# Patient Record
Sex: MALE | Race: OTHER | NOT HISPANIC OR LATINO | URBAN - METROPOLITAN AREA
[De-identification: names, ages, dates, MRNs, and addresses within clinical notes are randomized per-mention and may not be internally consistent; named-entity substitution may affect disease eponyms.]

---

## 2019-02-15 ENCOUNTER — EMERGENCY (EMERGENCY)
Facility: HOSPITAL | Age: 29
LOS: 1 days | Discharge: HOME | End: 2019-02-15
Attending: PERSONAL EMERGENCY RESPONSE ATTENDANT | Admitting: PERSONAL EMERGENCY RESPONSE ATTENDANT
Payer: SELF-PAY

## 2019-02-15 VITALS
TEMPERATURE: 98 F | OXYGEN SATURATION: 100 % | RESPIRATION RATE: 16 BRPM | HEART RATE: 65 BPM | DIASTOLIC BLOOD PRESSURE: 64 MMHG | SYSTOLIC BLOOD PRESSURE: 117 MMHG

## 2019-02-15 DIAGNOSIS — Z98.890 OTHER SPECIFIED POSTPROCEDURAL STATES: Chronic | ICD-10-CM

## 2019-02-15 PROCEDURE — 99283 EMERGENCY DEPT VISIT LOW MDM: CPT

## 2019-02-15 RX ORDER — KETOROLAC TROMETHAMINE 30 MG/ML
30 SYRINGE (ML) INJECTION ONCE
Qty: 0 | Refills: 0 | Status: DISCONTINUED | OUTPATIENT
Start: 2019-02-15 | End: 2019-02-15

## 2019-02-15 RX ADMIN — Medication 30 MILLIGRAM(S): at 20:07

## 2019-02-15 NOTE — ED PROVIDER NOTE - NSFOLLOWUPINSTRUCTIONS_ED_ALL_ED_FT
1.  Take tylenol 650 mg by mouth alternating with motrin 600 mg by mouth every 3 hours (Tylenol, 3 hours later motrin, 3 hours later tylenol etc) with food/water for comfort.  2.  Make sure to hydrate with water, avoid alcohol and you should never drink any alcohol prior to, during or after taking tylenol.   3.  Use ice for bilateral knee pain to reduce inflammation, elevate bilateral legs to reduce inflammation. Use a heating pad for additional comfort for lower back.  4.  You may return to work/full physical activity as soon as you feel fully improved.    5.  Follow-up with your primary care provider as needed.  6.  For any back injury whether it is this back injury or any other, please return to ED immediately for severe or uncontrolled pain, loss of bowel or bladder control, weakness/numbness or tingling of your legs.

## 2019-02-15 NOTE — ED PROVIDER NOTE - PHYSICAL EXAMINATION
diffuse midline diffuse midline L spine TTP, TTP over bilateral SI joints and bilateral paraspinal lumbar muscles, no focal TTP, no stepoffs.      Bilateral knees, no focal area of TTP, no swelling.  No laxity on anterior/posterior drawer/lachman's, patellas tracking appropriately without crepitus, no focal TTP over bilateral patella's/popliteal fossa, remains nvsc intact distal to site.  Ambulatory with a cane which he brought with him.

## 2019-02-15 NOTE — ED ADULT TRIAGE NOTE - CHIEF COMPLAINT QUOTE
Slip and fall last night.  Presents with c/o back and right knee twisted and landed on left knee.  Denies head trauma

## 2019-02-15 NOTE — ED PROVIDER NOTE - CLINICAL SUMMARY MEDICAL DECISION MAKING FREE TEXT BOX
1.  Take tylenol 650 mg by mouth alternating with motrin 600 mg by mouth every 3 hours (Tylenol, 3 hours later motrin, 3 hours later tylenol etc) with food/water for comfort.  2.  Make sure to hydrate with water, avoid alcohol and you should never drink any alcohol prior to, during or after taking tylenol.   3.  Use ice for bilateral knee pain to reduce inflammation, elevate bilateral legs to reduce inflammation. Use a heating pad for additional comfort for lower back.  4.  You may return to work/full physical activity as soon as you feel fully improved.    5.  Follow-up with your primary care provider as needed. You were seen today for both knee and back pain.  1.  Take tylenol 650 mg by mouth alternating with motrin 600 mg by mouth every 3 hours (Tylenol, 3 hours later motrin, 3 hours later tylenol etc) with food/water for comfort.  2.  Make sure to hydrate with water, avoid alcohol and you should never drink any alcohol prior to, during or after taking tylenol.   3.  Use ice for bilateral knee pain to reduce inflammation, elevate bilateral legs to reduce inflammation. Use a heating pad for additional comfort for lower back.  4.  You may return to work/full physical activity as soon as you feel fully improved.    5.  Follow-up with your primary care provider as needed.

## 2019-02-15 NOTE — ED PROVIDER NOTE - OBJECTIVE STATEMENT
29 y/o male with no PMHx presents to the ED with s/p slipped and fall. Pt states he slipped and fell backwards on a patch of ice last night, and woke up the next day with soreness. Of note Pt has pain in lower back and both knees, Pt also state he had a meniscus tear in L knee and has had previous surgery before. Pt also denies any LOC, head trauma, or irregular BM or voiding.

## 2019-02-15 NOTE — ED PROVIDER NOTE - NS_ ATTENDINGSCRIBEDETAILS _ED_A_ED_FT
Attending MD Starr.  Agree with above.  PT seen and assessed with scribe assistance in documentation in real time.

## 2019-09-16 ENCOUNTER — EMERGENCY (EMERGENCY)
Facility: HOSPITAL | Age: 29
LOS: 1 days | Discharge: HOME | End: 2019-09-16
Attending: EMERGENCY MEDICINE | Admitting: EMERGENCY MEDICINE
Payer: MEDICAID

## 2019-09-16 ENCOUNTER — TRANSCRIPTION ENCOUNTER (OUTPATIENT)
Age: 29
End: 2019-09-16

## 2019-09-16 VITALS
SYSTOLIC BLOOD PRESSURE: 121 MMHG | HEART RATE: 67 BPM | RESPIRATION RATE: 18 BRPM | DIASTOLIC BLOOD PRESSURE: 76 MMHG | TEMPERATURE: 98 F | OXYGEN SATURATION: 100 %

## 2019-09-16 VITALS
RESPIRATION RATE: 18 BRPM | SYSTOLIC BLOOD PRESSURE: 117 MMHG | DIASTOLIC BLOOD PRESSURE: 73 MMHG | HEART RATE: 58 BPM | OXYGEN SATURATION: 100 % | TEMPERATURE: 99 F

## 2019-09-16 DIAGNOSIS — Z98.890 OTHER SPECIFIED POSTPROCEDURAL STATES: Chronic | ICD-10-CM

## 2019-09-16 PROCEDURE — 99284 EMERGENCY DEPT VISIT MOD MDM: CPT

## 2019-09-16 PROCEDURE — 99283 EMERGENCY DEPT VISIT LOW MDM: CPT

## 2019-09-16 RX ORDER — PSEUDOEPHEDRINE HCL 30 MG
30 TABLET ORAL ONCE
Refills: 0 | Status: COMPLETED | OUTPATIENT
Start: 2019-09-16 | End: 2019-09-16

## 2019-09-16 RX ORDER — KETOROLAC TROMETHAMINE 30 MG/ML
30 SYRINGE (ML) INJECTION ONCE
Refills: 0 | Status: DISCONTINUED | OUTPATIENT
Start: 2019-09-16 | End: 2019-09-16

## 2019-09-16 RX ADMIN — Medication 30 MILLIGRAM(S): at 06:27

## 2019-09-16 NOTE — ED PROVIDER NOTE - NSFOLLOWUPINSTRUCTIONS_ED_ALL_ED_FT
-- Please use 650mg Tylenol (also called acetaminophen) every 4 hours & 600mg Motrin (also called Advil or ibuprofen) every 6 hours as needed for pain/discomfort/swelling. You can get these without a prescription. Don't use more than 3000mg of Tylenol in any 24-hour period. Make sure your other prescription/over-the-counter medications don't contain any Tylenol so you don't take too much. If you have any stomach discomfort while taking Motrin, you can use TUMS or Pepcid or Zantac (these can also be bought without a prescription).   -- Use the nasal rinse frequently.  Use the phenylephrine nasal spray twice a day.  -- Please follow up with your doctor(s) within 3 days, but seek care sooner if your symptoms are worsening.  -- Rest, increase your fluid intake and avoid dehydration & alcohol.   -- It is very important to be diligent about hand washing & hygiene to avoid spreading the illness to others.  -- If you are having any worsening or continued fever, chills, weakness, nausea, vomiting, abdominal pain, please see your doctor or return to the Emergency Department.  -- Please continue taking your home medications as directed.  Do not use alcohol when taking any medication (especially antibiotics, tylenol or other pain medication) unless you check with the doctor or pharmacist.

## 2019-09-16 NOTE — ED ADULT TRIAGE NOTE - CHIEF COMPLAINT QUOTE
pt. seen earlier today for HA/congestion, dx w/ a sinus infection, prescribed a nasal spray and told to take Tylenol/Ibuprofen but has not taken any, reports no improvement in symptoms. Denies PMHx.

## 2019-09-16 NOTE — ED PROVIDER NOTE - CLINICAL SUMMARY MEDICAL DECISION MAKING FREE TEXT BOX
Well appearing pt w/cough and congestion. WN/WD/WH in NAD. Non toxic, no sign SBI including sepsis, meningitis, pneumonia, or pharyngitis. No abd pathology suspected based on exam and presentation.  No labs or imaging needed. Consistent with viral URI. Motrin/tylenol prn fever, fluids, anticipatory guidance, PMD follow up, d/c home.

## 2019-09-16 NOTE — ED PROVIDER NOTE - OBJECTIVE STATEMENT
28M no hx now p/w HA, midface/sinus pressure, sensation of dripping down his throat making him cough, congestion x 2 days, worse tonight.  took OTC thjeraflu, nasal rinse, allegra, nyquil without relief.  No fever.

## 2019-09-16 NOTE — ED PROVIDER NOTE - PATIENT PORTAL LINK FT
You can access the FollowMyHealth Patient Portal offered by [facility] by registering at the following website: [email or web address]. By joining [employer]’s FollowMyHealth portal, you will also be able to view your health information using other applications (apps) compatible with our system.

## 2019-09-16 NOTE — ED ADULT TRIAGE NOTE - CHIEF COMPLAINT QUOTE
Pt p/w with HA, sinus pressure, productive cough, congestion x 2 days.  took OTC cough med without relief.  rpts seasonal allergies.  denies pmhx

## 2019-09-16 NOTE — ED PROVIDER NOTE - PHYSICAL EXAMINATION
VS: afebrile, others reassuring  Gen: Well appearing in NAD  Head: NC/AT  HEENT: mild sinus ttp b/l, moist mucous membranes   nasal mucosa inflammed w/o polyp  Neck: trachea midline  Resp:  No distress  Ext: no deformities  Neuro:  A&Ox3  Skin:  Warm and dry as visualized  Psych:  appropriate affect and mood

## 2019-09-17 VITALS
TEMPERATURE: 97 F | OXYGEN SATURATION: 99 % | HEART RATE: 61 BPM | SYSTOLIC BLOOD PRESSURE: 108 MMHG | DIASTOLIC BLOOD PRESSURE: 56 MMHG | RESPIRATION RATE: 18 BRPM

## 2019-09-17 LAB
ALBUMIN SERPL ELPH-MCNC: 5.1 G/DL — HIGH (ref 3.3–5)
ALP SERPL-CCNC: 78 U/L — SIGNIFICANT CHANGE UP (ref 40–120)
ALT FLD-CCNC: 27 U/L — SIGNIFICANT CHANGE UP (ref 4–41)
ANION GAP SERPL CALC-SCNC: 12 MMO/L — SIGNIFICANT CHANGE UP (ref 7–14)
AST SERPL-CCNC: 20 U/L — SIGNIFICANT CHANGE UP (ref 4–40)
BILIRUB SERPL-MCNC: 0.6 MG/DL — SIGNIFICANT CHANGE UP (ref 0.2–1.2)
BUN SERPL-MCNC: 7 MG/DL — SIGNIFICANT CHANGE UP (ref 7–23)
CALCIUM SERPL-MCNC: 9.6 MG/DL — SIGNIFICANT CHANGE UP (ref 8.4–10.5)
CHLORIDE SERPL-SCNC: 103 MMOL/L — SIGNIFICANT CHANGE UP (ref 98–107)
CO2 SERPL-SCNC: 26 MMOL/L — SIGNIFICANT CHANGE UP (ref 22–31)
CREAT SERPL-MCNC: 1 MG/DL — SIGNIFICANT CHANGE UP (ref 0.5–1.3)
GLUCOSE SERPL-MCNC: 72 MG/DL — SIGNIFICANT CHANGE UP (ref 70–99)
HCT VFR BLD CALC: 46.2 % — SIGNIFICANT CHANGE UP (ref 39–50)
HGB BLD-MCNC: 14.4 G/DL — SIGNIFICANT CHANGE UP (ref 13–17)
MCHC RBC-ENTMCNC: 27.8 PG — SIGNIFICANT CHANGE UP (ref 27–34)
MCHC RBC-ENTMCNC: 31.2 % — LOW (ref 32–36)
MCV RBC AUTO: 89.2 FL — SIGNIFICANT CHANGE UP (ref 80–100)
NRBC # FLD: 0 K/UL — SIGNIFICANT CHANGE UP (ref 0–0)
PLATELET # BLD AUTO: 160 K/UL — SIGNIFICANT CHANGE UP (ref 150–400)
PMV BLD: 11 FL — SIGNIFICANT CHANGE UP (ref 7–13)
POTASSIUM SERPL-MCNC: 4 MMOL/L — SIGNIFICANT CHANGE UP (ref 3.5–5.3)
POTASSIUM SERPL-SCNC: 4 MMOL/L — SIGNIFICANT CHANGE UP (ref 3.5–5.3)
PROT SERPL-MCNC: 8.4 G/DL — HIGH (ref 6–8.3)
RBC # BLD: 5.18 M/UL — SIGNIFICANT CHANGE UP (ref 4.2–5.8)
RBC # FLD: 11.9 % — SIGNIFICANT CHANGE UP (ref 10.3–14.5)
SODIUM SERPL-SCNC: 141 MMOL/L — SIGNIFICANT CHANGE UP (ref 135–145)
WBC # BLD: 8.54 K/UL — SIGNIFICANT CHANGE UP (ref 3.8–10.5)
WBC # FLD AUTO: 8.54 K/UL — SIGNIFICANT CHANGE UP (ref 3.8–10.5)

## 2019-09-17 PROCEDURE — 70496 CT ANGIOGRAPHY HEAD: CPT | Mod: 26

## 2019-09-17 RX ORDER — ACETAMINOPHEN 500 MG
975 TABLET ORAL ONCE
Refills: 0 | Status: COMPLETED | OUTPATIENT
Start: 2019-09-17 | End: 2019-09-17

## 2019-09-17 RX ORDER — METOCLOPRAMIDE HCL 10 MG
10 TABLET ORAL ONCE
Refills: 0 | Status: COMPLETED | OUTPATIENT
Start: 2019-09-17 | End: 2019-09-17

## 2019-09-17 RX ADMIN — Medication 10 MILLIGRAM(S): at 01:13

## 2019-09-17 RX ADMIN — Medication 975 MILLIGRAM(S): at 01:13

## 2019-09-17 NOTE — ED PROVIDER NOTE - PATIENT PORTAL LINK FT
You can access the FollowMyHealth Patient Portal offered by [facility] by registering at the following website: [email or web address]. By joining [employer]’s FollowMyHealth portal, you will also be able to view your health information using other applications (apps) compatible with our system.

## 2019-09-17 NOTE — ED PROVIDER NOTE - NSFOLLOWUPINSTRUCTIONS_ED_ALL_ED_FT
Follow up with your PMD within 48-72 hours.  Take Motrin 600 mg every 8 hours with food for pain. Take Tylenol 650mg 1 tab every 4-6 hours. Follow up with Neurology within the week. If you have issues obtaining follow up, please call: [phone] to obtain a doctor or specialist who takes your insurance in your area.   Worsening headache, dizziness, nausea, vomiting, weakness return to ER

## 2019-09-17 NOTE — ED PROVIDER NOTE - CLINICAL SUMMARY MEDICAL DECISION MAKING FREE TEXT BOX
27 y/o male with no PMHx presents to the ED c/o L sided occular pressure, dizziness, headache, nasal congestion for a long time but has worsened in the past 2 days. Sinusitis vs. migraine vs blood vessel injury. Plan for blood work and CT head. 27 y/o male with no PMHx presents to the ED c/o L sided occular pressure, dizziness, headache, nasal congestion for a long time but has worsened in the past 2 days. Sinusitis vs. migraine vs blood vessel injury (eg, venous sinus thrombosis). Plan for blood work and CT head. Pain control.

## 2019-09-17 NOTE — ED PROVIDER NOTE - OBJECTIVE STATEMENT
29 y/o male with no PMHx presents to the ED c/o L sided occular pressure, dizziness, headache, nasal congestion for a long time but has worsened in the past 2 days. Pt reports he recently smoked hookah. Pt reports pain is radiating to the L sided back of his head. Also reports hot flashes and nothing coming out when blowing his nose. Pt's R side of his face/ head unaffected. No fevers, nausea or vomiting. Pt was given decongestant that offered mild relief for 2 hours but symptoms returned. No other acute complaints at time of eval.

## 2020-03-05 ENCOUNTER — EMERGENCY (EMERGENCY)
Facility: HOSPITAL | Age: 30
LOS: 1 days | Discharge: HOME | End: 2020-03-05
Admitting: EMERGENCY MEDICINE
Payer: MEDICAID

## 2020-03-05 VITALS
SYSTOLIC BLOOD PRESSURE: 125 MMHG | TEMPERATURE: 98 F | HEART RATE: 53 BPM | OXYGEN SATURATION: 100 % | DIASTOLIC BLOOD PRESSURE: 60 MMHG | RESPIRATION RATE: 17 BRPM

## 2020-03-05 DIAGNOSIS — Z98.890 OTHER SPECIFIED POSTPROCEDURAL STATES: Chronic | ICD-10-CM

## 2020-03-05 PROCEDURE — 99282 EMERGENCY DEPT VISIT SF MDM: CPT

## 2020-03-05 NOTE — ED PROVIDER NOTE - PATIENT PORTAL LINK FT
You can access the FollowMyHealth Patient Portal offered by [facility] by registering at the following website: [email or web address]. By joining [employer]’s FollowMyHealth portal, you will also be able to view your health information using other applications (apps) compatible with our system.

## 2020-03-05 NOTE — ED PROVIDER NOTE - CLINICAL SUMMARY MEDICAL DECISION MAKING FREE TEXT BOX
L lower eyelid hordeolum   - warm compresses   - follow up with pcp  - if any worsening symptoms return to ED.

## 2020-03-05 NOTE — ED PROVIDER NOTE - NSFOLLOWUPINSTRUCTIONS_ED_ALL_ED_FT
Follow up with your primary care physician in 48-72 hours- bring copies of your results.  Return to the ER for worsening or persistent symptoms, including but not limited to worsening/persistent pain, shortness of breath, fevers, vomiting, lightheadedness, passing out and/or ANY NEW OR CONCERNING SYMPTOMS. If you have issues obtaining follow up, please call: [phone] to obtain a doctor or specialist who takes your insurance in your area.  You may call [phone] to make an appointment with the internal medicine clinic.    Apply warm compresses to the area daily   Do not rub eye with fingers  If no resolution within 1-2 weeks schedule an appointment with ophthalmology for further evaluation. Information was given to you.

## 2020-03-05 NOTE — ED PROVIDER NOTE - CHIEF COMPLAINT
The patient is a 29y Male complaining of eye pain/injury.
No evidence of meningial irritation/Supple/No adenopathy

## 2020-03-05 NOTE — ED ADULT TRIAGE NOTE - CHIEF COMPLAINT QUOTE
Pt c/o swelling to left eye. Pt noted to have slight redness and swelling to left eye. Pt denies visual disturbances.

## 2020-03-05 NOTE — ED PROVIDER NOTE - OBJECTIVE STATEMENT
28 y/o M no reported pmhx p/w L lower eyelid swelling x 1 week. Tried applying ice with no relief. Pt denies any drainage. No trauma. No visual change, blurry or double vision. No fever, chills, or URI symptoms.

## 2022-10-13 NOTE — ED PROVIDER NOTE - ENMT, MLM
PRE-SEDATION ASSESSMENT    CONSENT  Risks, benefits, and alternatives have been discussed with the patient/patient representative, and patient/patient representative agrees to proceed: Yes    MEDICAL HISTORY  Significant medical/surgical history: No  Past Complications with Sedation/Anesthesia: No  Significant Family History: No  Smoking History: No  Alcohol/Drug abuse: No  Possible Pregnancy (LMP): Not Applicable  Cardiac History: Yes  Respiratory History: No    PHYSICAL EXAM  History and Physical Reviewed: H&P completed today  Airway Risk History: No previous complications  Airway Anatomy : Class II  Heart : Normal  Lungs : Normal  LOC/Mental Status : Normal    OTHER FINDINGS  Reviewed current medications and allergies: Yes  Pertinent lab/diagnostic test reviewed: Yes    SEDATION RISK ASSESSMENT  Risk Status ASA: Class II - Normal patient with mild systemic disease  Plan for Sedation: Moderate Sedation  EKG Monitoring: Yes    NARRATIVE FINDINGS      Airway patent, Nasal mucosa clear. Mouth with normal mucosa. Throat has no vesicles, no oropharyngeal exudates and uvula is midline.

## 2024-07-02 NOTE — ED ADULT TRIAGE NOTE - AS TEMP SITE
Lab Results   Component Value Date    EGFR 56 06/28/2024    EGFR 60 05/16/2024    EGFR 67 10/05/2021    CREATININE 1.48 (H) 06/28/2024    CREATININE 1.39 (H) 05/16/2024    CREATININE 1.48 (H) 10/05/2021         Lab Results   Component Value Date    HGBA1C 6.2 (H) 05/30/2024      [id number]: Albumin Cr Ratio WNL  17IRR36: Uric Acid 6.2  68VRP50: UA Normal  US Kidney unremarkable according to nephrology note however could not find recent report   Baseline sCr. 1.4-1.5.    Previously following nephro but lost to follow up  Now following [facility] Nephro w/ last visit [id number]    Symptoms of lower extremity swelling    Uric acid nephropathy vs atherosclerotic nephropathy vs other vs Nephrotic nephritic syndrome  Nephrotic nephritic less likely since urine protein not elevated.     Plan:  Avoid hypoperfusion of the kidneys, minimize nephrotoxins  Control modifiable risk factors, BP Glucose  Avoid nephrotoxic agents like NSAIDS RAAS Blockers  Continue allopurinol 100mg PO daily w/ goal uric acid <6  Consider ECHO if symptoms do not resolve for potential cardiac evolvement   Strict lifestyle management  Life style modifications  Eat real food, not too much, mostly plants  Avoid processed foods  DASH Diet  Limit salt intake: education given on how to read nutriton labels to find salt (ie listed as Salt, NaCl, Sodium). Pt was educated on types of foods and drinks with high salt content.   Do not drink your calories  Eat a piece of fruit or vegetable 30 mins prior to meals  Avoid sugar and sugar substitutes  Limit meat products  Per AHA guidelines, recommend moderate-vigorous intensity exercise for 30 minutes a day for 5 days a week or a total of 150 min/week  Keep exercise journal  Small changes in activity makes a big difference ie take stairs instead of elevators or park farther away from you destination  Try to weigh yourself daily at same time of day and keep journal  Keep food journal   Sleep hygine  Mindful meditation for  15mins a day 5 days a week. [employer] timer a good free walt.      oral

## 2024-11-22 NOTE — ED PROVIDER NOTE - ATTESTATION, MLM
I have reviewed and confirmed nurses' notes for patient's medications, allergies, medical history, and surgical history. Patient received A&O x3 breathing unlabored complaining of right shoulder pain.

## 2024-11-24 ENCOUNTER — EMERGENCY (EMERGENCY)
Facility: HOSPITAL | Age: 34
LOS: 0 days | Discharge: HOME | End: 2024-11-25
Attending: STUDENT IN AN ORGANIZED HEALTH CARE EDUCATION/TRAINING PROGRAM
Payer: MEDICAID

## 2024-11-24 VITALS
SYSTOLIC BLOOD PRESSURE: 118 MMHG | HEART RATE: 65 BPM | HEIGHT: 74 IN | WEIGHT: 195.11 LBS | RESPIRATION RATE: 16 BRPM | TEMPERATURE: 98 F | OXYGEN SATURATION: 98 % | DIASTOLIC BLOOD PRESSURE: 72 MMHG

## 2024-11-24 DIAGNOSIS — M25.561 PAIN IN RIGHT KNEE: ICD-10-CM
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
## 2024-11-24 DIAGNOSIS — M54.50 LOW BACK PAIN, UNSPECIFIED: ICD-10-CM
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
## 2024-11-24 DIAGNOSIS — M25.562 PAIN IN LEFT KNEE: ICD-10-CM

## 2024-11-24 DIAGNOSIS — Z98.890 OTHER SPECIFIED POSTPROCEDURAL STATES: Chronic | ICD-10-CM

## 2024-11-24 PROCEDURE — 99284 EMERGENCY DEPT VISIT MOD MDM: CPT

## 2024-11-24 NOTE — ED ADULT TRIAGE NOTE - CHIEF COMPLAINT QUOTE
pt c/o bilateral knee pain and lower back pain, since friday, states while at work, he slid on ice/snow and fell down. denies hitting head. pt took tylenol yesterday with some relief.

## 2024-11-25 RX ORDER — KETOROLAC TROMETHAMINE 30 MG/ML
15 INJECTION INTRAMUSCULAR; INTRAVENOUS ONCE
Refills: 0 | Status: DISCONTINUED | OUTPATIENT
Start: 2024-11-25 | End: 2024-11-25

## 2024-11-25 RX ORDER — LIDOCAINE HYDROCHLORIDE 40 MG/ML
1 SOLUTION TOPICAL ONCE
Refills: 0 | Status: COMPLETED | OUTPATIENT
Start: 2024-11-25 | End: 2024-11-25

## 2024-11-25 RX ORDER — METHOCARBAMOL 500 MG/1
1500 TABLET ORAL ONCE
Refills: 0 | Status: COMPLETED | OUTPATIENT
Start: 2024-11-25 | End: 2024-11-25

## 2024-11-25 RX ORDER — IBUPROFEN 200 MG
600 TABLET ORAL ONCE
Refills: 0 | Status: COMPLETED | OUTPATIENT
Start: 2024-11-25 | End: 2024-11-25

## 2024-11-25 RX ORDER — NAPROXEN 250 MG/1
1 TABLET ORAL
Qty: 20 | Refills: 0
Start: 2024-11-25 | End: 2024-12-04

## 2024-11-25 RX ORDER — METHOCARBAMOL 500 MG/1
2 TABLET ORAL
Qty: 40 | Refills: 0
Start: 2024-11-25 | End: 2024-12-04

## 2024-11-25 RX ADMIN — LIDOCAINE HYDROCHLORIDE 1 PATCH: 40 SOLUTION TOPICAL at 00:43

## 2024-11-25 RX ADMIN — Medication 600 MILLIGRAM(S): at 00:45

## 2024-11-25 RX ADMIN — METHOCARBAMOL 1500 MILLIGRAM(S): 500 TABLET ORAL at 00:46

## 2024-11-25 NOTE — ED PROVIDER NOTE - NS ED ROS FT
CONST: no fevers, no chills  EYES: no pain, no vision changes  ENT: no sore throat, no ear pain, no change in hearing  CV: no chest pain, no leg swelling  RESP: no shortness of breath, no cough  ABD: no abdominal pain, no nausea, no vomiting, no diarrhea  : no dysuria, no flank pain, no hematuria  MSK: (+) back pain, (+) extremity pain  NEURO: no headache or additional neurologic complaints  HEME: no easy bleeding  SKIN:  no rash

## 2024-11-25 NOTE — ED PROVIDER NOTE - INTERNATIONAL TRAVEL
Get eyes checked    Consider ACP consultation to help with ACP and living will    -Recommend 150 minutes of cardiovascular activity a week, or 10,000 to 15,000 steps a day, 2 days of weightbearing  -dietary wise, try to stick to your weight x 10 in calories a day  -avoid processed/refined carbohydrates (boxed/canned/frozen/fast)  -encourage healthy protein and fat, butter, avocado, egg    Get blood work today and urine studies today    See me back in 6 months  Personalized Preventive Plan for Carolyn Coker - 11/1/2021  Medicare offers a range of preventive health benefits. Some of the tests and screenings are paid in full while other may be subject to a deductible, co-insurance, and/or copay. Some of these benefits include a comprehensive review of your medical history including lifestyle, illnesses that may run in your family, and various assessments and screenings as appropriate. After reviewing your medical record and screening and assessments performed today your provider may have ordered immunizations, labs, imaging, and/or referrals for you. A list of these orders (if applicable) as well as your Preventive Care list are included within your After Visit Summary for your review. Other Preventive Recommendations:    · A preventive eye exam performed by an eye specialist is recommended every 1-2 years to screen for glaucoma; cataracts, macular degeneration, and other eye disorders. · A preventive dental visit is recommended every 6 months. · Try to get at least 150 minutes of exercise per week or 10,000 steps per day on a pedometer . · Order or download the FREE \"Exercise & Physical Activity: Your Everyday Guide\" from [employer]. Call 7-301.253.6478 or search [employer] online. · You need 9640-4905 mg of calcium and 1940-2465 IU of vitamin D per day.  It is possible to meet your calcium requirement with diet alone, but a vitamin D supplement is usually necessary to meet this goal.  · When exposed to the sun, use a sunscreen that protects against both UVA and UVB radiation with an SPF of 30 or greater. Reapply every 2 to 3 hours or after sweating, drying off with a towel, or swimming. · Always wear a seat belt when traveling in a car. Always wear a helmet when riding a bicycle or motorcycle. No

## 2024-11-25 NOTE — ED PROVIDER NOTE - NSFOLLOWUPCLINICS_GEN_ALL_ED_FT
[facility]  Sports Medicine  [address] [place] [address]  Phone: [phone]  Fax:

## 2024-11-25 NOTE — ED PROVIDER NOTE - PATIENT PORTAL LINK FT
You can access the FollowMyHealth Patient Portal offered by [facility] by registering at the following website: [email or web address]. By joining [employer]’s FollowMyHealth portal, you will also be able to view your health information using other applications (apps) compatible with our system.

## 2024-11-25 NOTE — ED ADULT NURSE NOTE - OBJECTIVE STATEMENT
34Y A&OX4 M no PMH c/o lower back pain and B/L knee pain s/p fall on Friday. [place]. states he fell on his lower back and B/L knees. states pain has worsened since the fall and hasn't been able to go to the doctor since. denies head strike/LOC.

## 2024-11-25 NOTE — ED PROVIDER NOTE - PHYSICAL EXAMINATION
General: No acute distress, mentation at baseline,  well nourished, well developed  HEENT: NCAT, Neck supple without meningismus, PERRL, no conjunctival injection  Lungs: CTAB, No wheeze or crackles, No retractions, No increased work of breathing  Heart: S1S2 RRR, No M/R/G, Pules equal Bilaterally in upper and lower extremities distally  Abd: soft, NT/ND, No guarding, No rebound.  No hernias, no palpable masses.  Extrem: FROM in all joints, no gross deformities appreciated, no significant edema noted, No ulcers. Cap refil < 2sec.  Skin: No rash noted, warm dry.  Neuro:  Grossly normal.  No difficulty ambulating. No focal deficits.  Psychiatric: Appropriate mood and affect.   Back: No midline ttp, step offs, deformities, no cvat, paraspinal TTP in lumbar spine  B/l knee: Patella nontender, Medial and Lateral Joint lines nontender, Posterior drawer and Lachman’s exam without significant laxity, Varus and Valgus Stress without significant laxity, Full Range of Motion with full strength, Neurovascular exam distally intact, Compartments surrounding are soft

## 2024-11-25 NOTE — ED ADULT NURSE NOTE - NSFALLRISKINTERV_ED_ALL_ED

## 2024-11-25 NOTE — ED PROVIDER NOTE - CLINICAL SUMMARY MEDICAL DECISION MAKING FREE TEXT BOX
\34-year-old male with no past medical history presenting with fall 3 days ago.  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  Experiencing lower back pain and bilateral knee pain, did not directly fall onto knees but states he twisted both knees.  Denies any head trauma LOC.  Took Tylenol with some relief.  Exam findings above, low suspcion for fracture, clinical presentation likely secondary to sprain.  Patient offered imaging but shared decision making had, suspicion for fracture and patient states he would prefer to get referral for MRI.  Will discharge with analgesia and provide analgesia here

## 2024-12-08 ENCOUNTER — EMERGENCY (EMERGENCY)
Facility: HOSPITAL | Age: 34
LOS: 1 days | Discharge: HOME | End: 2024-12-08
Admitting: EMERGENCY MEDICINE
Payer: MEDICAID

## 2024-12-08 VITALS
HEART RATE: 75 BPM | TEMPERATURE: 98 F | OXYGEN SATURATION: 96 % | WEIGHT: 210.1 LBS | HEIGHT: 74 IN | RESPIRATION RATE: 16 BRPM | SYSTOLIC BLOOD PRESSURE: 121 MMHG | DIASTOLIC BLOOD PRESSURE: 74 MMHG

## 2024-12-08 DIAGNOSIS — Z98.890 OTHER SPECIFIED POSTPROCEDURAL STATES: Chronic | ICD-10-CM

## 2024-12-08 PROCEDURE — 99284 EMERGENCY DEPT VISIT MOD MDM: CPT

## 2024-12-08 RX ORDER — IBUPROFEN 200 MG
600 TABLET ORAL ONCE
Refills: 0 | Status: COMPLETED | OUTPATIENT
Start: 2024-12-08 | End: 2024-12-08

## 2024-12-08 RX ORDER — LIDOCAINE 40 MG/G
1 CREAM TOPICAL ONCE
Refills: 0 | Status: COMPLETED | OUTPATIENT
Start: 2024-12-08 | End: 2024-12-08

## 2024-12-08 RX ADMIN — LIDOCAINE 1 PATCH: 40 CREAM TOPICAL at 23:43

## 2024-12-08 RX ADMIN — Medication 600 MILLIGRAM(S): at 23:43

## 2024-12-08 NOTE — ED PROVIDER NOTE - PROGRESS NOTE DETAILS
KLAUDIA Mehta: pt feels better ambulating without difficulty.  Results reviewed with patient.  Discharge reviewed and discussed with patient.

## 2024-12-08 NOTE — ED ADULT NURSE NOTE - NSFALLRISKINTERV_ED_ALL_ED

## 2024-12-08 NOTE — ED ADULT NURSE NOTE - NS ED NOTE ABUSE SUSPICION NEGLECT YN
Called and left message for patient to advise that colorectal screening is due. Asked patient to return call to the office to discuss.  
No

## 2024-12-08 NOTE — ED PROVIDER NOTE - PATIENT PORTAL LINK FT
You can access the FollowMyHealth Patient Portal offered by [facility] by registering at the following website: [email or web address]. By joining [employer]’s FollowMyHealth portal, you will also be able to view your health information using other applications (apps) compatible with our system.

## 2024-12-08 NOTE — ED PROVIDER NOTE - CLINICAL SUMMARY MEDICAL DECISION MAKING FREE TEXT BOX
34-year-old male with no significant past medical history presents to the emergency department status post mechanical trip and fall 6 days ago c/o low back pain and b/l knee pain.  Patient states he slipped on ice causing injury. Pt is well appearing, NAD, NV intact, MSK pain, will obtain xrays, pain control, follow up PMD/Ortho.

## 2024-12-08 NOTE — ED ADULT NURSE NOTE - OBJECTIVE STATEMENT
pt received to intake, A&Ox4. Pt endorsing lower back pain, b/l knee pain s/p slip and fall on ice on monday. Pt denies LOC/head strike/blood thinner use. No deformity/trauma noted. pt denies numbness/tingling, headache, dizziness, SOB, N/V/D, chest pain. respirations even and unlabored. pending XRAY. safety maintained, call bell in reach

## 2024-12-08 NOTE — ED PROVIDER NOTE - PHYSICAL EXAMINATION
+TTP right sided paraspinal lumbar region no midline TTP, no swelling, no deformity noted.  B/L knees:  +TTP at anterior knees, no swelling, no deformity, FROM, NV intact.

## 2024-12-08 NOTE — ED ADULT TRIAGE NOTE - MEANS OF ARRIVAL
-- DO NOT REPLY / DO NOT REPLY ALL --  -- Message is from the [facility]--    COVID-19 Universal Screening: N/A - Not about scheduling    General Patient Message      Reason for Call: Patient has a question for Dr. Tubbs. Please call back.    Caller Information       Type Contact Phone    09/21/2020 11:23 AM CDT Phone (Incoming) Manjit Iqbal (Self) [phone] (H)          Alternative phone number: none    Turnaround time given to caller:   \"This message will be sent to [state Provider's name]. The clinical team will fulfill your request as soon as they review your message.\"     ambulatory

## 2024-12-08 NOTE — ED PROVIDER NOTE - NSFOLLOWUPINSTRUCTIONS_ED_ALL_ED_FT
Follow up with your Doctor in 1-2 days.  Follow up with Orthopedics in 1-2 days.(see attached list)  Rest.  Ice 3-4 x a day.   Take Ibuprofen 400mg orally every 6 hours as needed for pain take with food.  Return to the ER for any persistent/worsening or new symptoms weakness, numbness or any concerning symptoms.

## 2024-12-08 NOTE — ED PROVIDER NOTE - OBJECTIVE STATEMENT
34-year-old male with no significant past medical history presents to the emergency department status post mechanical trip and fall 6 days ago c/o low back pain and b/l knee pain.  Patient states he slipped on ice causing injury.  Patient denies head trauma, LOC, weakness, numbness, tingling, fecal/urinary incontinence.

## 2024-12-08 NOTE — ED ADULT TRIAGE NOTE - CHIEF COMPLAINT QUOTE
c/o lower back pain and b/l knee pain s/p slip and fall on Monday. Denies LOC, head trauma, blood thinners. Denies medical history.

## 2024-12-08 NOTE — ED ADULT TRIAGE NOTE - MODE OF ARRIVAL
1/28/2021   CARE MANAGEMENT NOTE:  CM reviewed EMR for clinical updates. Pt was admitted with COVID, acute respiratory insufficiency with hypoxia.    Reportedly, pt resides with his wife who has been to inpt psych.    Secondary Decision Maker: Augie Castillo - Child - [phone]    Secondary Decision Maker: Claudette Springer - Son - [phone]    Secondary Decision Maker: Miranda Mason - Daughter - [phone]   Luz Maria Nyaer, pt's brother in law ([phone]) is employed in healthcare and he has been instrumental in encouraging pt to consider pulmonary rehab.     RUR 10%; LOS 17 days     Transition Plan of Care:   1.  Psych eval completed on 1/26 and per NP pt does not have capacity to make medical decisions. Son Xavier Even agrees to pt going to pulmonary rehab especially since pt cannot be in the home alone (pt's wife will be in a facility for one month). 2.  [facility] inpt rehab - pt has been accepted and bed is available today. RN, please call report to [phone]. Pt will go to room 208. 3.  [facility] was arranged for 4:45 p.m however they called to report a delay and revised pickup at 5:45 p.m.        No further post discharge needs identified at this writing.    [facility]

## 2024-12-09 VITALS
HEART RATE: 61 BPM | SYSTOLIC BLOOD PRESSURE: 112 MMHG | RESPIRATION RATE: 18 BRPM | OXYGEN SATURATION: 98 % | TEMPERATURE: 98 F | DIASTOLIC BLOOD PRESSURE: 67 MMHG

## 2024-12-09 PROCEDURE — 73564 X-RAY EXAM KNEE 4 OR MORE: CPT | Mod: 26,50

## 2025-06-10 NOTE — ED PROVIDER NOTE - TEMPLATE, MLM
"  Assessment & Plan   Problem List Items Addressed This Visit    None  Visit Diagnoses         Shortness of breath    -  Primary    Relevant Medications    budesonide-formoterol (SYMBICORT/BREYNA) 80-4.5 MCG/ACT inhaler    doxycycline hyclate (VIBRA-TABS) 100 MG tablet    predniSONE (DELTASONE) 20 MG tablet      Cough, unspecified type        Relevant Medications    budesonide-formoterol (SYMBICORT/BREYNA) 80-4.5 MCG/ACT inhaler    doxycycline hyclate (VIBRA-TABS) 100 MG tablet    predniSONE (DELTASONE) 20 MG tablet      Hx of bacterial pneumonia        Relevant Medications    budesonide-formoterol (SYMBICORT/BREYNA) 80-4.5 MCG/ACT inhaler    doxycycline hyclate (VIBRA-TABS) 100 MG tablet    predniSONE (DELTASONE) 20 MG tablet      Abdominal pain, right upper quadrant        Relevant Orders    US Abdomen Limited           Assessment & Plan  Dyspnea:  - Likely related to a lingering infection from previous pneumonia.  - Prescribe Symbicort inhaler, 2 puffs twice a day. Consider pulmonary function testing if symptoms persist.    Cough, unspecified type:  -recent chest xray, recent CT scan   - Persistent cough possibly due to lingering infection.  - Prescribe doxycycline for broader antibiotic coverage. Prescribe prednisone, 2 pills once a day for 5 days, then 1 pill once a day for 5 days.    Hx of bacterial pneumonia:  - Previous pneumonia may have led to current symptoms.  - Monitor symptoms and reassess if no improvement by the end of the week or early next week.    Abdominal pain, right upper quadrant:  - Unclear etiology, possibly related to soft tissue.  - Order an ultrasound to investigate further.    Consent was obtained from the patient to use an AI documentation tool in the creation of this note.       BMI  Estimated body mass index is 39 kg/m  as calculated from the following:    Height as of this encounter: 1.67 m (5' 5.75\").    Weight as of this encounter: 108.8 kg (239 lb 12.8 oz).           Subjective " "  Brynn is a 59 year old, presenting for the following health issues:  Cough (She has a persistent cough after having pneumonia. She has shortness of breath when taking showers and with anything.)        6/10/2025     9:16 AM   Additional Questions   Roomed by KAREY Amin   Accompanied by Self     Cough    History of Present Illness       Reason for visit:  Cough, ribs   She is taking medications regularly.    Brynn Finley, a 59-year-old female, reported having pneumonia approximately a month to a month and a half ago. She was treated with a Z-Sameer and steroids. A CT scan was performed, which showed nodular opacities in the right lung, thought to be infectious or inflammatory, possibly related to the recent pneumonia. She experienced some improvement but continues to feel significant fatigue, which is unusual for her. She also experiences persistent coughing, particularly at night, as noted by her , and describes her lungs as crackling frequently. She feels short of breath with minimal exertion, such as showering or climbing stairs. In the mornings, she coughs up a small amount of thick yellowish sputum. She has a sensation of her lungs being constrained, describing it as feeling like they are inside a can. She has been sensitive to air quality, which exacerbates her symptoms. She uses Flonase regularly, which provides some relief. She has not had any surgeries and retains all her organs, including her gallbladder.                  Objective    /72 (BP Location: Right arm, Patient Position: Sitting, Cuff Size: Adult Large)   Pulse 98   Temp 97.9  F (36.6  C) (Oral)   Resp 16   Ht 1.67 m (5' 5.75\")   Wt 108.8 kg (239 lb 12.8 oz)   LMP  (LMP Unknown)   SpO2 96%   BMI 39.00 kg/m    Body mass index is 39 kg/m .  Physical Exam  Vitals and nursing note reviewed.   Constitutional:       General: She is not in acute distress.     Appearance: Normal appearance. She is not ill-appearing.   HENT:      Head: " Normocephalic and atraumatic.   Cardiovascular:      Rate and Rhythm: Normal rate and regular rhythm.      Pulses: Normal pulses.      Heart sounds: Normal heart sounds.   Pulmonary:      Effort: Pulmonary effort is normal. No respiratory distress.      Breath sounds: Normal breath sounds. No wheezing, rhonchi or rales.   Abdominal:      General: Bowel sounds are normal.      Palpations: Abdomen is soft.      Tenderness: There is abdominal tenderness.       Neurological:      General: No focal deficit present.      Mental Status: She is alert and oriented to person, place, and time. Mental status is at baseline.                  Signed Electronically by: Nereida Rebollar, CNP     Cold/Sinus